# Patient Record
Sex: MALE | Race: WHITE | Employment: FULL TIME | URBAN - METROPOLITAN AREA
[De-identification: names, ages, dates, MRNs, and addresses within clinical notes are randomized per-mention and may not be internally consistent; named-entity substitution may affect disease eponyms.]

---

## 2018-02-08 PROBLEM — R06.09 DOE (DYSPNEA ON EXERTION): Status: ACTIVE | Noted: 2018-02-08

## 2022-03-18 ENCOUNTER — HOSPITAL ENCOUNTER (OUTPATIENT)
Dept: LAB | Age: 62
Discharge: HOME | End: 2022-03-18
Payer: COMMERCIAL

## 2022-03-18 DIAGNOSIS — I48.21 PERMANENT ATRIAL FIBRILLATION (HCC): ICD-10-CM

## 2022-03-18 PROBLEM — Z45.010 PACEMAKER AT END OF BATTERY LIFE: Status: ACTIVE | Noted: 2022-03-18

## 2022-03-18 LAB
ANION GAP SERPL CALC-SCNC: 4 MMOL/L (ref 7–16)
BASOPHILS # BLD: 0 K/UL (ref 0–0.2)
BASOPHILS NFR BLD: 1 % (ref 0–2)
BUN SERPL-MCNC: 14 MG/DL (ref 8–23)
CALCIUM SERPL-MCNC: 9.5 MG/DL (ref 8.3–10.4)
CHLORIDE SERPL-SCNC: 104 MMOL/L (ref 98–107)
CO2 SERPL-SCNC: 29 MMOL/L (ref 21–32)
CREAT SERPL-MCNC: 0.8 MG/DL (ref 0.8–1.5)
DIFFERENTIAL METHOD BLD: ABNORMAL
EOSINOPHIL # BLD: 0.1 K/UL (ref 0–0.8)
EOSINOPHIL NFR BLD: 2 % (ref 0.5–7.8)
ERYTHROCYTE [DISTWIDTH] IN BLOOD BY AUTOMATED COUNT: 11.9 % (ref 11.9–14.6)
GLUCOSE SERPL-MCNC: 99 MG/DL (ref 65–100)
HCT VFR BLD AUTO: 40.1 % (ref 41.1–50.3)
HGB BLD-MCNC: 14.2 G/DL (ref 13.6–17.2)
IMM GRANULOCYTES # BLD AUTO: 0 K/UL (ref 0–0.5)
IMM GRANULOCYTES NFR BLD AUTO: 0 % (ref 0–5)
INR PPP: 1
LYMPHOCYTES # BLD: 0.9 K/UL (ref 0.5–4.6)
LYMPHOCYTES NFR BLD: 24 % (ref 13–44)
MCH RBC QN AUTO: 32.3 PG (ref 26.1–32.9)
MCHC RBC AUTO-ENTMCNC: 35.4 G/DL (ref 31.4–35)
MCV RBC AUTO: 91.3 FL (ref 79.6–97.8)
MONOCYTES # BLD: 0.4 K/UL (ref 0.1–1.3)
MONOCYTES NFR BLD: 10 % (ref 4–12)
NEUTS SEG # BLD: 2.4 K/UL (ref 1.7–8.2)
NEUTS SEG NFR BLD: 64 % (ref 43–78)
NRBC # BLD: 0 K/UL (ref 0–0.2)
PLATELET # BLD AUTO: 202 K/UL (ref 150–450)
PMV BLD AUTO: 10.2 FL (ref 9.4–12.3)
POTASSIUM SERPL-SCNC: 3.8 MMOL/L (ref 3.5–5.1)
PROTHROMBIN TIME: 13.6 SEC (ref 12.6–14.5)
RBC # BLD AUTO: 4.39 M/UL (ref 4.23–5.6)
SODIUM SERPL-SCNC: 137 MMOL/L (ref 136–145)
WBC # BLD AUTO: 3.8 K/UL (ref 4.3–11.1)

## 2022-03-18 PROCEDURE — 80048 BASIC METABOLIC PNL TOTAL CA: CPT

## 2022-03-18 PROCEDURE — 36415 COLL VENOUS BLD VENIPUNCTURE: CPT

## 2022-03-18 PROCEDURE — 85025 COMPLETE CBC W/AUTO DIFF WBC: CPT

## 2022-03-18 PROCEDURE — 85610 PROTHROMBIN TIME: CPT

## 2022-03-19 PROBLEM — R06.09 DOE (DYSPNEA ON EXERTION): Status: ACTIVE | Noted: 2018-02-08

## 2022-03-22 NOTE — PROGRESS NOTES
Attempted to pre-assess patient for procedure tomorrow. Patient states that Kendell from [facility] cardiology had already called him and reviewed all instructions for pre-procedure. Reminded patient of NPO status after MN.

## 2022-03-23 ENCOUNTER — HOSPITAL ENCOUNTER (OUTPATIENT)
Age: 62
Setting detail: OUTPATIENT SURGERY
Discharge: HOME | End: 2022-03-23
Attending: INTERNAL MEDICINE | Admitting: INTERNAL MEDICINE
Payer: COMMERCIAL

## 2022-03-23 VITALS
HEIGHT: 71 IN | HEART RATE: 70 BPM | DIASTOLIC BLOOD PRESSURE: 92 MMHG | SYSTOLIC BLOOD PRESSURE: 115 MMHG | OXYGEN SATURATION: 93 % | BODY MASS INDEX: 22.4 KG/M2 | WEIGHT: 160 LBS | TEMPERATURE: 97.9 F

## 2022-03-23 DIAGNOSIS — Z95.0 CARDIAC PACEMAKER: ICD-10-CM

## 2022-03-23 DIAGNOSIS — I48.21 PERMANENT ATRIAL FIBRILLATION (HCC): Chronic | ICD-10-CM

## 2022-03-23 DIAGNOSIS — Z45.010 PACEMAKER AT END OF BATTERY LIFE: Primary | ICD-10-CM

## 2022-03-23 DIAGNOSIS — Z45.010 PACEMAKER AT END OF BATTERY LIFE: ICD-10-CM

## 2022-03-23 LAB
ATRIAL RATE: 40 BPM
ATRIAL RATE: 55 BPM
CALCULATED R AXIS, ECG10: 36 DEGREES
CALCULATED R AXIS, ECG10: 56 DEGREES
CALCULATED T AXIS, ECG11: 91 DEGREES
CALCULATED T AXIS, ECG11: 93 DEGREES
DIAGNOSIS, 93000: NORMAL
DIAGNOSIS, 93000: NORMAL
Q-T INTERVAL, ECG07: 478 MS
Q-T INTERVAL, ECG07: 518 MS
QRS DURATION, ECG06: 198 MS
QRS DURATION, ECG06: 200 MS
QTC CALCULATION (BEZET), ECG08: 472 MS
QTC CALCULATION (BEZET), ECG08: 497 MS
VENTRICULAR RATE, ECG03: 50 BPM
VENTRICULAR RATE, ECG03: 65 BPM

## 2022-03-23 PROCEDURE — 99152 MOD SED SAME PHYS/QHP 5/>YRS: CPT | Performed by: INTERNAL MEDICINE

## 2022-03-23 PROCEDURE — 77030013504 HC DEV ELECSURG MEDT -F: Performed by: INTERNAL MEDICINE

## 2022-03-23 PROCEDURE — 77030031139 HC SUT VCRL2 J&J -A: Performed by: INTERNAL MEDICINE

## 2022-03-23 PROCEDURE — 74011000250 HC RX REV CODE- 250: Performed by: INTERNAL MEDICINE

## 2022-03-23 PROCEDURE — 93005 ELECTROCARDIOGRAM TRACING: CPT | Performed by: INTERNAL MEDICINE

## 2022-03-23 PROCEDURE — 33227 REMOVE&REPLACE PM GEN SINGL: CPT | Performed by: INTERNAL MEDICINE

## 2022-03-23 PROCEDURE — 77030010507 HC ADH SKN DERMBND J&J -B: Performed by: INTERNAL MEDICINE

## 2022-03-23 PROCEDURE — 77030041279 HC DRSG PRMSL AG MDII -B: Performed by: INTERNAL MEDICINE

## 2022-03-23 PROCEDURE — 77030003028 HC SUT VCRL J&J -A: Performed by: INTERNAL MEDICINE

## 2022-03-23 PROCEDURE — 99153 MOD SED SAME PHYS/QHP EA: CPT | Performed by: INTERNAL MEDICINE

## 2022-03-23 PROCEDURE — C1786 PMKR, SINGLE, RATE-RESP: HCPCS | Performed by: INTERNAL MEDICINE

## 2022-03-23 PROCEDURE — 74011250636 HC RX REV CODE- 250/636: Performed by: INTERNAL MEDICINE

## 2022-03-23 DEVICE — IPG W1SR01 AZURE XT SR MRI USA
Type: IMPLANTABLE DEVICE | Status: FUNCTIONAL
Brand: AZURE™ XT SR MRI SURESCAN™

## 2022-03-23 RX ORDER — SODIUM CHLORIDE 9 MG/ML
75 INJECTION, SOLUTION INTRAVENOUS CONTINUOUS
Status: DISCONTINUED | OUTPATIENT
Start: 2022-03-23 | End: 2022-03-23 | Stop reason: HOSPADM

## 2022-03-23 RX ORDER — CEPHALEXIN 500 MG/1
500 CAPSULE ORAL 4 TIMES DAILY
Qty: 20 CAPSULE | Refills: 0 | Status: SHIPPED | OUTPATIENT
Start: 2022-03-23

## 2022-03-23 RX ORDER — SODIUM CHLORIDE 0.9 % (FLUSH) 0.9 %
5-40 SYRINGE (ML) INJECTION EVERY 8 HOURS
Status: DISCONTINUED | OUTPATIENT
Start: 2022-03-23 | End: 2022-03-23 | Stop reason: HOSPADM

## 2022-03-23 RX ORDER — CEFAZOLIN SODIUM/WATER 2 G/20 ML
2 SYRINGE (ML) INTRAVENOUS ONCE
Status: COMPLETED | OUTPATIENT
Start: 2022-03-23 | End: 2022-03-23

## 2022-03-23 RX ORDER — LIDOCAINE HYDROCHLORIDE 10 MG/ML
INJECTION, SOLUTION EPIDURAL; INFILTRATION; INTRACAUDAL; PERINEURAL AS NEEDED
Status: DISCONTINUED | OUTPATIENT
Start: 2022-03-23 | End: 2022-03-23 | Stop reason: HOSPADM

## 2022-03-23 RX ORDER — MIDAZOLAM HYDROCHLORIDE 1 MG/ML
INJECTION, SOLUTION INTRAMUSCULAR; INTRAVENOUS AS NEEDED
Status: DISCONTINUED | OUTPATIENT
Start: 2022-03-23 | End: 2022-03-23 | Stop reason: HOSPADM

## 2022-03-23 RX ORDER — SODIUM CHLORIDE 0.9 % (FLUSH) 0.9 %
5-40 SYRINGE (ML) INJECTION AS NEEDED
Status: DISCONTINUED | OUTPATIENT
Start: 2022-03-23 | End: 2022-03-23 | Stop reason: HOSPADM

## 2022-03-23 RX ORDER — HYDROCODONE BITARTRATE AND ACETAMINOPHEN 5; 325 MG/1; MG/1
1 TABLET ORAL
Qty: 20 TABLET | Refills: 0 | Status: SHIPPED | OUTPATIENT
Start: 2022-03-23 | End: 2022-03-28

## 2022-03-23 RX ORDER — FENTANYL CITRATE 50 UG/ML
INJECTION, SOLUTION INTRAMUSCULAR; INTRAVENOUS AS NEEDED
Status: DISCONTINUED | OUTPATIENT
Start: 2022-03-23 | End: 2022-03-23 | Stop reason: HOSPADM

## 2022-03-23 RX ADMIN — CEFAZOLIN SODIUM 2 G: 100 INJECTION, POWDER, LYOPHILIZED, FOR SOLUTION INTRAVENOUS at 14:59

## 2022-03-23 NOTE — PROGRESS NOTES
TRANSFER - OUT REPORT:    Verbal report given to Duglas Thompson RN(name) on [employer]  being transferred to CPRU(unit) for routine progression of care       Report consisted of patients Situation, Background, Assessment and   Recommendations(SBAR). Information from the following report(s) SBAR was reviewed with the receiving nurse.     Medtronic pacemaker generator change with Dr Lyon Rota: VVI 50  Left chest incision closed with sutures and dermabond  Covered with aquacel  Versed 3mg  Fentanyl 75mcg  Pt tolerated well

## 2022-03-23 NOTE — DISCHARGE INSTRUCTIONS
Post Op Device Instructions        Incision & Dressing Care   Please keep Aquacel dressing on wound until your 2 week follow up in device clinic. The dressing promotes healing and is meant to stay on the wound. Keep incision site completely dry for 48 hours. During this time you may take a sponge bath, but no shower. After 48 hours you may shower with your back to the water letting the water run over the dressing. Do not let the water directly hit the dressing, it is water resistant no water proof. Do not use any lotions, creams, or ointments on the incision. Avoid manipulating the device or leads with your fingers. Do not massage or excessively rub the implant site. This could displace the leads      For four weeks after your implant   Do not lift anything heavier than a gallon of milk. Do not raise your elbow above the level of your shoulder on the Left shoulder implant side. Avoid excessive pushing, pulling, or twisting. Call you doctor for any of the following:   Any signs of infection, including redness, swelling or pain at the incision site, or a   temperature of 101° F or greater. If you have twitching chest muscles, hiccups that won't stop, or a swollen arm on the   side of the incision. Any feelings of light-headedness, chest pain, or shortness of breath. Please notify your doctors and dentists that you have an Pacemaker. You should not drive until 1 week after your implant or after your first follow-up appointment, whichever comes first. At that time, you can discuss when you may return to your regular driving routine. About 1 month after implant, you will receive a card by mail from the company who manufactured your pacemaker. Your device was manufactured by [employer]. You should carry this card with you at all times. Please call the [address] [place] [address] Drive at  [phone] if you have questions or concerns about your device.  Please call the hospital if it is after 5 pm or the weekend please page the on call cardiologist at [facility] at [address] will need to have your device checked 1- 2 weeks after the procedure and should have an appointment with the device clinic. If you do not hear from them call the device clinic. Sedation for a Medical Procedure: Care Instructions     You were given a sedative medication during your visit. While many of the effects will have worn   off before you leave; you may continue to feel some effects for several hours. Common side effects from sedation include:  · Feeling sleepy. (Your doctors and nurses will make sure you are not too sleepy to go home.)  · Nausea and vomiting. This usually does not last long. · Feeling tired. How can you care for yourself at home? Activity    · Don't do anything for 24 hours that requires attention to detail. It takes time for the medicine effects to completely wear off. · Do not make important legal decisions for 24 hours. · Do not sign any legal documents for 24 hours. · Do not drink alcohol today     · For your safety, you should not drive or operate heavy machinery for the remainder of the day     · Rest when you feel tired. Getting enough sleep will help you recover. Diet    · You can eat your normal diet, unless your doctor gives you other instructions. If your stomach is upset, try clear liquids and bland, low-fat foods like plain toast or rice. · Drink plenty of fluids (unless your doctor tells you not to). · Don't drink alcohol for 24 hours. Medicines    · Be safe with medicines. Read and follow all instructions on the label. · If the doctor gave you a prescription medicine for pain, take it as prescribed. · If you are not taking a prescription pain medicine, ask your doctor if you can take an over-the-counter medicine.      · If you think your pain medicine is making you sick to your stomach:  · Take your medicine after meals (unless your doctor has told you not to). · Ask your doctor for a different pain medicine. I have read the above instructions and have had the opportunity to ask questions.       Patient: ________________________   Date: _____________    Witness: _______________________   Date: _____________

## 2022-03-23 NOTE — PROGRESS NOTES
Patient up to bedside, vital signs and site stable. Patient ambulated to bathroom without difficulty. Patient voided without difficulty. Incision site stable. Discharge instructions and home medications reviewed with patient. Time allowed for questions and answers. Site stable after ambulation. Peripheral IV sites dc'd without difficulty with tips intact. 1520 Patient discharged to home with family.

## 2022-03-23 NOTE — Clinical Note
Aspirin Registry Question:   Aspirin was discussed with physician prior to the procedure. Aspirin was not given prior to the procedure.  Not applicable

## 2022-03-23 NOTE — PROGRESS NOTES
Patient received to [address] [place] [address] room # 12  Ambulatory from [facility]. Patient scheduled for pacemaker generator change today with Dr Wilmer Baig. Procedure reviewed & questions answered, voiced good understanding consent obtained & placed on chart. All medications and medical history reviewed. Will prep patient per orders. Patient & family updated on plan of care.

## 2022-03-23 NOTE — PROGRESS NOTES
Report received from [address] [place] [address]. Procedural findings communicated. Intra procedural  medication administration reviewed. Progression of care discussed. Patient received into [id number] [facility] post procedure.      Incision site without bleeding or swelling yes    Dressing dry and intact yes    Patient instructed to limit movement to right upper extremity    Routine post procedural vital signs and site assessment initiated yes

## 2022-03-24 PROBLEM — Z45.010 PACEMAKER AT END OF BATTERY LIFE: Status: ACTIVE | Noted: 2022-03-18

## 2022-08-10 RX ORDER — LOSARTAN POTASSIUM 25 MG/1
TABLET ORAL
Qty: 90 TABLET | Refills: 0 | Status: SHIPPED | OUTPATIENT
Start: 2022-08-10 | End: 2022-08-15 | Stop reason: SDUPTHER

## 2022-08-11 ENCOUNTER — TELEPHONE (OUTPATIENT)
Dept: CARDIOLOGY CLINIC | Age: 62
End: 2022-08-11

## 2022-08-11 NOTE — TELEPHONE ENCOUNTER
Remote received w/ increasing RV pacing noted. Appt scheduled to turn on rate response and schedule a cardiology appt.

## 2022-08-15 RX ORDER — LOSARTAN POTASSIUM 25 MG/1
TABLET ORAL
Qty: 90 TABLET | Refills: 0 | Status: SHIPPED | OUTPATIENT
Start: 2022-08-15 | End: 2022-08-29 | Stop reason: SDUPTHER

## 2022-08-28 ASSESSMENT — ENCOUNTER SYMPTOMS
ABDOMINAL DISTENTION: 0
PHOTOPHOBIA: 0
COUGH: 0
DIARRHEA: 0
ABDOMINAL PAIN: 0
SORE THROAT: 0
CONSTIPATION: 0

## 2022-08-28 NOTE — PROGRESS NOTES
[facility] CARDIOLOGY  [address], [address]  PHONE: [phone]        NAME:  Britni Schaefer  : 1960  MRN: [id number]     PCP:  Ellie Cartagena MD      SUBJECTIVE:   Britni Schaefer is a 57 y.o. male seen for a follow up visit regarding the following:     Chief Complaint   Patient presents with    Atrial Fibrillation    Follow-up       HPI:    Doing well since last visit without interval angina, CHF, palpitations, edema, presyncope or syncope but still complains of exertional dyspnea, stable with no signs or symptoms of volume overload. He is in permanent atrial fibrillation with a ventricular pacemaker. Ejection fraction historically normal.  [id number]-EDOb or low. He is still exercising but having more musculoskeletal complaints today and complains of poor sleep recently as well. Prior nuclear stress from   normal and echo from 2016 in [facility] showed low normal EF without any significant valvular pathology, stable RV and biatrial enlargement. He has as CHADS-VASC score of \"0\" and prefers to continue low dose ASA at present rather than risk  bleeding issues with coumadin or NOAC. Nuclear stress test 2018 showed normal perfusion without ischemia, EF 61%. Echo at that time showed low normal EF with no significant valvular pathology and normal otherwise. He tried toprol XL 25mg daily with recurrent exertional/exercise HR  >180bpm and didn't like the way the Toprol made him feel. Still with occasional bursts of tachy on interrogation today but usually with bike riding/exercise/exertion around the home. Pacing ~ 50% (V-pacing) in permanent AF. Nuclear stress test a couple of days prior to our last visit also normal with ejection fraction 64%, normal left ventricular regional wall motion and ejection fraction, and normal perfusion with no ischemia.   He never had his echocardiogram.  He is clinically euvolemic  today. His blood pressure has been good but he is tolerating losartan and HCTZ fairly well, refilling today. Going [facility]. Past Medical History, Past Surgical History, Family history, Social History, and Medications were all reviewed with the patient today and updated as necessary. Current Outpatient Medications   Medication Sig Dispense Refill    Multiple Vitamins-Minerals (MULTI COMPLETE PO) Take by mouth daily      losartan (COZAAR) 25 MG tablet TAKE 1 TABLET BY MOUTH DAILY 90 tablet 0    hydroCHLOROthiazide (HYDRODIURIL) 25 MG tablet Take 25 mg by mouth daily       No current facility-administered medications for this visit. No Known Allergies    Patient Active Problem List    Diagnosis Date Noted    Pacemaker at end of battery life 03/18/2022     Overview Note:     Added automatically from request for surgery [id number]        MONTES (dyspnea on exertion) 02/08/2018    Permanent atrial fibrillation ([facility] 10/07/2016     Overview Note:     CHADS of \"0\", wants to continue ASA therapy        Malaise and fatigue 10/07/2016     Overview Note:     Tiredness/Weakness        Cardiac pacemaker 10/07/2016    Sick sinus syndrome ([address] 08/08/2016        Past Surgical History:   Procedure Laterality Date    ORTHOPEDIC SURGERY      ACL repair    ORTHOPEDIC SURGERY      meniscus repair    PACEMAKER      VT CARDIAC SURG PROCEDURE UNLIST         History reviewed. No pertinent family history. Social History     Tobacco Use    Smoking status: Never    Smokeless tobacco: Never   Substance Use Topics    Alcohol use: Yes       ROS:    Review of Systems   Constitutional:  Negative for appetite change, chills, diaphoresis and fatigue. HENT:  Negative for congestion, mouth sores, nosebleeds, sore throat and tinnitus. Eyes:  Negative for photophobia and visual disturbance. Respiratory:  Positive for shortness of breath. Negative for cough.     Cardiovascular:  Negative for chest pain, palpitations and leg swelling. Gastrointestinal:  Negative for abdominal distention, abdominal pain, constipation and diarrhea. Endocrine: Negative for cold intolerance, heat intolerance, polydipsia and polyuria. Genitourinary:  Negative for dysuria and hematuria. Musculoskeletal:  Negative for arthralgias, joint swelling and myalgias. Skin:  Negative for rash. Allergic/Immunologic: Negative for environmental allergies and food allergies. Neurological:  Negative for dizziness, seizures, syncope and light-headedness. Hematological:  Negative for adenopathy. Does not bruise/bleed easily. Psychiatric/Behavioral:  Negative for agitation, behavioral problems, dysphoric mood and hallucinations. The patient is not nervous/anxious. PHYSICAL EXAM:     Vitals:    08/29/22 0952   BP: 106/80   Pulse: 50   Weight: 162 lb 1.6 oz (73.5 kg)   Height: 5' 11\" (1.803 m)      Wt Readings from Last 3 Encounters:   08/29/22 162 lb 1.6 oz (73.5 kg)   07/01/21 160 lb (72.6 kg)   06/08/21 160 lb (72.6 kg)      BP Readings from Last 3 Encounters:   08/29/22 106/80   07/01/21 (!) 140/84   06/08/21 (!) 140/90        Physical Exam  Constitutional:       Appearance: Normal appearance. He is normal weight. HENT:      Head: Normocephalic and atraumatic. Nose: Nose normal.      Mouth/Throat:      Mouth: Mucous membranes are moist.      Pharynx: Oropharynx is clear. Eyes:      Extraocular Movements: Extraocular movements intact. Pupils: Pupils are equal, round, and reactive to light. Neck:      Vascular: No carotid bruit or JVD. Cardiovascular:      Rate and Rhythm: Normal rate and regular rhythm. Heart sounds: No murmur heard. No friction rub. No gallop. Comments: Pacer right chest well healed  Pulmonary:      Effort: Pulmonary effort is normal.      Breath sounds: Normal breath sounds. No wheezing or rhonchi. Abdominal:      General: Abdomen is flat.  Bowel sounds are normal. There is no distension. Palpations: Abdomen is soft. Tenderness: There is no abdominal tenderness. Musculoskeletal:         General: No swelling. Normal range of motion. Cervical back: Normal range of motion and neck supple. No tenderness. Skin:     General: Skin is warm and dry. Neurological:      General: No focal deficit present. Mental Status: He is alert and oriented to person, place, and time. Mental status is at baseline. Psychiatric:         Mood and Affect: Mood normal.         Behavior: Behavior normal.        Medical problems and test results were reviewed with the patient today. No results found for: CHOL  No results found for: TRIG  No results found for: HDL  No results found for: LDLCHOLESTEROL, LDLCALC  No results found for: LABVLDL, VLDL  No results found for: [facility]     Lab Results   Component Value Date/Time     03/18/2022 10:01 AM    K 3.8 03/18/2022 10:01 AM     03/18/2022 10:01 AM    CO2 29 03/18/2022 10:01 AM    BUN 14 03/18/2022 10:01 AM    CREATININE 0.80 03/18/2022 10:01 AM    GLUCOSE 99 03/18/2022 10:01 AM    CALCIUM 9.5 03/18/2022 10:01 AM         No results for input(s): WBC, HGB, HCT, MCV, PLT in the last 720 hours. No results found for: LABA1C  No results found for: EAG     No results found for: BNP     No results found for: TSHFT4, TSH     Results for orders placed or performed in visit on 08/29/22   EKG 12 Lead    Impression    Electronic ventricular pacemaker 50bpm  Underlying atrial fibrillation        ASSESSMENT and PLAN:     Diagnoses and all orders for this visit:      Sick sinus syndrome ([address]- tachy-michelle syndrome with permanent AF-  pacer interrogation showed blunted histograms now with HR <50bpm 34-47% of the time despite no rate slowing meds, but also complains  of extreme tachy and SOB with exercise/bicycling- see below- clinically asymptomatic at rest, but fatigued chronically - nuclear stress and echo OK.   Increased base rate to 60bpm, added toprol XL 25mg daily but didn't like the way it made him feel, offered  trial of cardizem CD and increase base rate to 60-70 but he prefers to avoid changes today. Ultimately we converted him back to lower rate limit of 50bpm to prevent lots of RV pacing. ? MONTES due to michelle and RV pacing -  ? Consider BiV upgrade and increased base rate if continues to complain of SOB/MONTES/fatigue. Permanent atrial fibrillation ([address]- rate control stable, very active without palpitations, presyncope or syncope. Continue pacer interrogation surveillance, add rate slowing meds as needed in  future. CHADS-VASC of 0- discussed options of NOAC/coumadin vs. ASA, he wishes to just continue low dose ASA therapy for now. Cardiac pacemaker- site looks good today, see above, continue routine surveillance. Malaise and fatigue- slowly worsening but remains active and still exercising regularly. Continue meds/pacer monitoring. SOB/fatigue- normal nuclear stress- had sleep study several years ago that was reportedly negative. Return in about 6 months (around 2/28/2023).          Caesar Gonzalez MD  8/29/2022  10:16 AM

## 2022-08-29 ENCOUNTER — OFFICE VISIT (OUTPATIENT)
Dept: CARDIOLOGY CLINIC | Age: 62
End: 2022-08-29
Payer: COMMERCIAL

## 2022-08-29 VITALS
HEIGHT: 71 IN | BODY MASS INDEX: 22.69 KG/M2 | WEIGHT: 162.1 LBS | HEART RATE: 50 BPM | DIASTOLIC BLOOD PRESSURE: 80 MMHG | SYSTOLIC BLOOD PRESSURE: 106 MMHG

## 2022-08-29 DIAGNOSIS — Z95.0 CARDIAC PACEMAKER: ICD-10-CM

## 2022-08-29 DIAGNOSIS — I49.5 SICK SINUS SYNDROME (HCC): ICD-10-CM

## 2022-08-29 DIAGNOSIS — R06.09 DOE (DYSPNEA ON EXERTION): ICD-10-CM

## 2022-08-29 DIAGNOSIS — I48.21 PERMANENT ATRIAL FIBRILLATION (HCC): Primary | ICD-10-CM

## 2022-08-29 PROCEDURE — 99214 OFFICE O/P EST MOD 30 MIN: CPT | Performed by: INTERNAL MEDICINE

## 2022-08-29 PROCEDURE — 93000 ELECTROCARDIOGRAM COMPLETE: CPT | Performed by: INTERNAL MEDICINE

## 2022-08-29 RX ORDER — HYDROCHLOROTHIAZIDE 25 MG/1
25 TABLET ORAL DAILY
Qty: 90 TABLET | Refills: 3 | Status: SHIPPED | OUTPATIENT
Start: 2022-08-29

## 2022-08-29 RX ORDER — LOSARTAN POTASSIUM 25 MG/1
TABLET ORAL
Qty: 90 TABLET | Refills: 3 | Status: SHIPPED | OUTPATIENT
Start: 2022-08-29

## 2022-08-29 ASSESSMENT — ENCOUNTER SYMPTOMS: SHORTNESS OF BREATH: 1

## 2022-09-13 DIAGNOSIS — I48.21 PERMANENT ATRIAL FIBRILLATION (HCC): Primary | ICD-10-CM

## 2022-09-13 DIAGNOSIS — I49.5 SICK SINUS SYNDROME (HCC): ICD-10-CM

## 2022-09-19 DIAGNOSIS — I48.21 PERMANENT ATRIAL FIBRILLATION (HCC): Primary | ICD-10-CM

## 2022-09-19 DIAGNOSIS — I49.5 SICK SINUS SYNDROME (HCC): ICD-10-CM

## 2022-10-19 PROCEDURE — 93296 REM INTERROG EVL PM/IDS: CPT | Performed by: INTERNAL MEDICINE

## 2022-10-19 PROCEDURE — 93294 REM INTERROG EVL PM/LDLS PM: CPT | Performed by: INTERNAL MEDICINE

## 2022-11-11 DIAGNOSIS — I48.21 PERMANENT ATRIAL FIBRILLATION (HCC): ICD-10-CM

## 2022-11-11 DIAGNOSIS — I49.5 SICK SINUS SYNDROME (HCC): ICD-10-CM

## 2022-11-11 DIAGNOSIS — Z95.0 CARDIAC PACEMAKER: Primary | ICD-10-CM

## 2022-11-11 DIAGNOSIS — Z95.0 CARDIAC PACEMAKER: ICD-10-CM

## 2022-11-23 RX ORDER — LOSARTAN POTASSIUM 25 MG/1
TABLET ORAL
Qty: 90 TABLET | Refills: 3 | Status: SHIPPED | OUTPATIENT
Start: 2022-11-23

## 2023-03-07 DIAGNOSIS — Z95.0 CARDIAC PACEMAKER: ICD-10-CM

## 2023-03-07 DIAGNOSIS — I48.21 PERMANENT ATRIAL FIBRILLATION (HCC): ICD-10-CM

## 2023-03-07 DIAGNOSIS — I49.5 SICK SINUS SYNDROME (HCC): ICD-10-CM

## 2023-04-02 ASSESSMENT — ENCOUNTER SYMPTOMS
SORE THROAT: 0
ABDOMINAL DISTENTION: 0
SHORTNESS OF BREATH: 1
DIARRHEA: 0
CONSTIPATION: 0
COUGH: 0
PHOTOPHOBIA: 0
ABDOMINAL PAIN: 0

## 2023-04-02 NOTE — PROGRESS NOTES
syndrome ([facility] [address]- tachy-michelle syndrome with permanent AF-  pacer interrogation showed blunted histograms now with HR <50bpm 34-47% of the time despite no rate slowing meds, but also complains  of extreme tachy and SOB with exercise/bicycling- see below- clinically asymptomatic at rest, but fatigued chronically - nuclear stress and echo OK. Increased base rate to 60bpm, added toprol XL 25mg daily but didn't like the way it made him feel, offered  trial of cardizem CD and increase base rate to 60-70 but he prefers to avoid changes today. Ultimately we converted him back to lower rate limit of 50bpm to prevent lots of RV pacing. Currently pacing RV 44% of the time. .. ? Consider BiV upgrade and increased base rate if continues to complain of SOB/MONTES/fatigue. Permanent [facility] fibrillation ([facility]- rate control stable, very active without palpitations, presyncope or syncope. Continue pacer interrogation surveillance, add rate slowing meds as needed in  future. CHADS-VASC of 1- discussed options of NOAC/coumadin vs. ASA, he wishes to just continue low dose ASA therapy for now. At next visit his score will be 2. See above. He will research Eliquis and let me know if he's ready to start, understands increased CVA risk without [address] on board. Hypertension-well-controlled, continue losartan and HCTZ as currently taking. Cardiac pacemaker- site looks good today, see above, continue routine surveillance. Malaise and fatigue-improved and stable since last visit with pacer adjustments as noted above, remains active and still exercising regularly. Continue meds/pacer monitoring. SOB/fatigue-improved, see above, normal nuclear stress- had sleep study several years ago that was reportedly negative. Return in about 6 months (around 10/4/2023).          Fabi Sol MD  4/4/2023  8:57 AM

## 2023-04-04 ENCOUNTER — OFFICE VISIT (OUTPATIENT)
Dept: CARDIOLOGY CLINIC | Age: 63
End: 2023-04-04
Payer: COMMERCIAL

## 2023-04-04 VITALS
SYSTOLIC BLOOD PRESSURE: 118 MMHG | HEIGHT: 71 IN | DIASTOLIC BLOOD PRESSURE: 74 MMHG | WEIGHT: 166.9 LBS | BODY MASS INDEX: 23.36 KG/M2 | HEART RATE: 51 BPM

## 2023-04-04 DIAGNOSIS — R06.09 DOE (DYSPNEA ON EXERTION): ICD-10-CM

## 2023-04-04 DIAGNOSIS — Z95.0 CARDIAC PACEMAKER: ICD-10-CM

## 2023-04-04 DIAGNOSIS — I48.21 PERMANENT ATRIAL FIBRILLATION (HCC): Primary | ICD-10-CM

## 2023-04-04 PROCEDURE — 99214 OFFICE O/P EST MOD 30 MIN: CPT | Performed by: INTERNAL MEDICINE

## 2023-04-04 PROCEDURE — 93000 ELECTROCARDIOGRAM COMPLETE: CPT | Performed by: INTERNAL MEDICINE

## 2023-04-28 ENCOUNTER — TELEPHONE (OUTPATIENT)
Dept: CARDIOLOGY CLINIC | Age: 63
End: 2023-04-28

## 2023-04-28 NOTE — TELEPHONE ENCOUNTER
Patient called stating that Dr Sofia Koch had discussed starting Eliquis during his last visit. Patient states he's ready to start but would to speak with someone first.    Please call and advise.

## 2023-05-08 ENCOUNTER — TELEPHONE (OUTPATIENT)
Age: 63
End: 2023-05-08

## 2023-05-08 NOTE — TELEPHONE ENCOUNTER
Pt would like to know if aspirin would be a good option for him to take because he rather take that than xarelto.

## 2023-05-08 NOTE — TELEPHONE ENCOUNTER
ASA does not reduce stroke risk in AF. Xarelto or eliquis is the best therapy for this and better than coumadin. I would continue xarelto. Easy to take, once daily.

## 2023-05-09 NOTE — TELEPHONE ENCOUNTER
Pt returned call. Pt informed of Dr. Mark Potts' response. Pt states he will try the xarelto and call if he has any reactions after starting.

## 2023-07-05 DIAGNOSIS — I48.21 PERMANENT ATRIAL FIBRILLATION (HCC): ICD-10-CM

## 2023-07-05 DIAGNOSIS — I49.5 SICK SINUS SYNDROME (HCC): ICD-10-CM

## 2023-07-05 DIAGNOSIS — Z95.0 CARDIAC PACEMAKER: ICD-10-CM

## 2023-07-10 ENCOUNTER — TELEPHONE (OUTPATIENT)
Age: 63
End: 2023-07-10

## 2023-07-10 ASSESSMENT — ENCOUNTER SYMPTOMS
DIARRHEA: 0
SORE THROAT: 0
PHOTOPHOBIA: 0
ABDOMINAL PAIN: 0
CONSTIPATION: 0
COUGH: 0
ABDOMINAL DISTENTION: 0

## 2023-07-10 NOTE — TELEPHONE ENCOUNTER
Pt dropped off a piece of paper for Dr. Joaquina Castillo. Placed in [address] [place] [address].

## 2023-07-10 NOTE — TELEPHONE ENCOUNTER
Pt c/o could not get up Sat due to weakness, fatigue. Went to [facility] ED. States mdt device interrogated and HR set at 50 bpm. Suggested rate should be increased to 60 bpm.   Asked pt to force transmission this AM.    Encourage pt to hydrate with water, at least 4-16oz yanez daily, more if doing outdoor recreation or activity. When hydration is low it can cause BP to drop, HR to speed up, trigger A fib, cause A Fib sx. Pt states sx not hydration issue. From device. Informed him someone from Device Dept will call. Pt voiced understanding.  cgh

## 2023-07-10 NOTE — PROGRESS NOTES
[facility] CARDIOLOGY  [address] [facility]  [place], [address] [facility]  PHONE: [phone]        NAME:  Ann-Marie Schaefer  : 1960  MRN: [id number]     PCP:  Nito Leigh MD      SUBJECTIVE:   Ann-Marie Schaefer is a 70 y.o. male seen for a follow up visit regarding the following:     Chief Complaint   Patient presents with    Atrial Fibrillation    Shortness of Breath       HPI:     He is in permanent atrial fibrillation with a ventricular pacemaker. Ejection fraction historically normal.  [id number] score 2. Taking Xarelto, losartan, and hydrochlorothiazide but recently had extreme fatigue and malaise while [place]. He came [place] the ER once [place] got [place] the [place] with evaluation completely normal including serial troponins, BNP, BMP and CBC. He is still pacing about 40 to 44% of the time but this is unchanged compared to the previous couple of years. Improving and I think his symptoms are most likely noncardiac. He denies any exertional dyspnea, volume overload symptoms, tachypalpitations, or angina whatsoever. Nuclear stress test 2018 showed normal perfusion without ischemia, EF 61%. Echo at that time showed low normal EF with no significant valvular pathology and normal otherwise. He tried toprol XL 25mg daily with recurrent exertional/exercise HR  >180bpm and didn't like the way the Toprol made him feel. Still with occasional bursts of tachy on interrogation today but usually with bike riding/exercise/exertion around the home. Pacing ~ 45-50% (V-pacing) in permanent AF. Nuclear stress test prior to our last visit also normal with ejection fraction 64%, normal left ventricular regional wall motion and ejection fraction, and normal perfusion with no ischemia. He never had his echocardiogram.  Clinically euvolemic today.     Atrial Fibrillation CHADSVASC2 Score Stroke Risk:   70 y.o. 77-78 - 1   male Male - 0   CHF HX: No - 0   HTN HX:

## 2023-07-10 NOTE — TELEPHONE ENCOUNTER
I think he would do much better in the long run if we upgraded to a biventricular pacemaker and increased his basal rate to 60 with rate response. Ask him if he is willing to consider this and if he is refer him to EP. If not willing to consider, increase his base rate to 60 and increased rate response. I think he is pacing his RV more the older he gets, but his resistance to augmenting device therapy has made symptoms worse.

## 2023-07-10 NOTE — TELEPHONE ENCOUNTER
Attempted to inform pt of Dr. Loli Drake response. Pt not happy with [facility] service, not happy with response. Frequent interruptions and arguments per pt. He states PPM is <1yr old. Why should it need to be updated? Pt does not wish to talk to Device Dept. Only wants to see Dr. Loli Drake. Offered appt Fri 7/14/23. Pt states may not make it until then. Will be \"flat\". Advise ER prn. Pt states was just in ER, Fri. Advise pt go to [facility] ED, UCD MD on call 24/7. Pt disgruntled. Pt states offended that Triage suggested his sx could be improved with drinking water. Informed him many calls since warmer weather of increased sx due to poor hydration. Informed pt may not be the source of sx but may help ease sx. Pt hung up on me.   Appt Fri 7/14/23 2p Dr. Loli Drake MA.  Camila Chadwick

## 2023-07-10 NOTE — TELEPHONE ENCOUNTER
[employer] report received. Patient has chronic afib w/ controlled rates. RV pacing at 41% increasing the rate will only increase RV pacing. Last echo in 7/21. Per last note he mentioned ? Upgrade. Would defer back to provider. Sick sinus syndrome ([address])- tachy-michelle syndrome with permanent AF-  pacer interrogation showed blunted histograms now with HR <50bpm 34-47% of the time despite no rate slowing meds, but also complains  of extreme tachy and SOB with exercise/bicycling- see below- clinically asymptomatic at rest, but fatigued chronically - nuclear stress and echo OK. Increased base rate to 60bpm, added toprol XL 25mg daily but didn't like the way it made him feel, offered  trial of cardizem CD and increase base rate to 60-70 but he prefers to avoid changes today. Ultimately we converted him back to lower rate limit of 50bpm to prevent lots of RV pacing. Currently pacing RV 44% of the time. .. ? Consider BiV upgrade and increased base rate if continues to complain of SOB/MONTES/fatigue.

## 2023-07-10 NOTE — TELEPHONE ENCOUNTER
Patient called stating he is experiencing the following symptoms :    Groggy  No energy  SOB  HR is trending upward  ER visit over weekend  Patient mentioned low potassium  Has pacemaker

## 2023-07-12 PROCEDURE — 93296 REM INTERROG EVL PM/IDS: CPT | Performed by: INTERNAL MEDICINE

## 2023-07-12 PROCEDURE — 93294 REM INTERROG EVL PM/LDLS PM: CPT | Performed by: INTERNAL MEDICINE

## 2023-07-14 ENCOUNTER — OFFICE VISIT (OUTPATIENT)
Age: 63
End: 2023-07-14

## 2023-07-14 VITALS
SYSTOLIC BLOOD PRESSURE: 122 MMHG | HEART RATE: 56 BPM | BODY MASS INDEX: 23.24 KG/M2 | DIASTOLIC BLOOD PRESSURE: 68 MMHG | WEIGHT: 166 LBS | HEIGHT: 71 IN

## 2023-07-14 DIAGNOSIS — R53.83 MALAISE AND FATIGUE: ICD-10-CM

## 2023-07-14 DIAGNOSIS — R06.09 DOE (DYSPNEA ON EXERTION): ICD-10-CM

## 2023-07-14 DIAGNOSIS — I48.21 PERMANENT ATRIAL FIBRILLATION (HCC): Primary | ICD-10-CM

## 2023-07-14 DIAGNOSIS — I49.5 SICK SINUS SYNDROME (HCC): ICD-10-CM

## 2023-07-14 DIAGNOSIS — Z95.0 CARDIAC PACEMAKER: ICD-10-CM

## 2023-07-14 DIAGNOSIS — R53.81 MALAISE AND FATIGUE: ICD-10-CM

## 2023-07-14 RX ORDER — MAGNESIUM 200 MG
200 TABLET ORAL DAILY
COMMUNITY

## 2023-07-14 ASSESSMENT — ENCOUNTER SYMPTOMS: SHORTNESS OF BREATH: 0

## 2023-08-23 ENCOUNTER — NURSE ONLY (OUTPATIENT)
Age: 63
End: 2023-08-23

## 2023-08-23 DIAGNOSIS — I49.5 SICK SINUS SYNDROME (HCC): Primary | ICD-10-CM

## 2023-08-23 DIAGNOSIS — I48.21 PERMANENT ATRIAL FIBRILLATION (HCC): ICD-10-CM

## 2023-09-22 ENCOUNTER — HOSPITAL ENCOUNTER (EMERGENCY)
Age: 63
Discharge: HOME | End: 2023-09-22
Attending: EMERGENCY MEDICINE
Payer: MEDICARE

## 2023-09-22 ENCOUNTER — APPOINTMENT (OUTPATIENT)
Dept: GENERAL RADIOLOGY | Age: 63
End: 2023-09-22
Payer: MEDICARE

## 2023-09-22 VITALS
HEIGHT: 71 IN | HEART RATE: 51 BPM | TEMPERATURE: 97.7 F | OXYGEN SATURATION: 96 % | RESPIRATION RATE: 16 BRPM | DIASTOLIC BLOOD PRESSURE: 78 MMHG | SYSTOLIC BLOOD PRESSURE: 139 MMHG | WEIGHT: 168 LBS | BODY MASS INDEX: 23.52 KG/M2

## 2023-09-22 DIAGNOSIS — R05.9 COUGH, UNSPECIFIED TYPE: ICD-10-CM

## 2023-09-22 DIAGNOSIS — R06.00 DYSPNEA, UNSPECIFIED TYPE: Primary | ICD-10-CM

## 2023-09-22 LAB
ANION GAP SERPL CALC-SCNC: 4 MMOL/L (ref 2–11)
BASOPHILS # BLD: 0 K/UL (ref 0–0.2)
BASOPHILS NFR BLD: 0 % (ref 0–2)
BILIRUB UR QL: NEGATIVE
BUN SERPL-MCNC: 11 MG/DL (ref 8–23)
CALCIUM SERPL-MCNC: 9.4 MG/DL (ref 8.3–10.4)
CHLORIDE SERPL-SCNC: 108 MMOL/L (ref 101–110)
CO2 SERPL-SCNC: 27 MMOL/L (ref 21–32)
CREAT SERPL-MCNC: 0.9 MG/DL (ref 0.8–1.5)
D DIMER PPP FEU-MCNC: 0.4 UG/ML(FEU)
DIFFERENTIAL METHOD BLD: ABNORMAL
EKG DIAGNOSIS: NORMAL
EKG Q-T INTERVAL: 422 MS
EKG QRS DURATION: 96 MS
EKG QTC CALCULATION (BAZETT): 452 MS
EKG R AXIS: -67 DEGREES
EKG T AXIS: -15 DEGREES
EKG VENTRICULAR RATE: 69 BPM
EOSINOPHIL # BLD: 0 K/UL (ref 0–0.8)
EOSINOPHIL NFR BLD: 0 % (ref 0.5–7.8)
ERYTHROCYTE [DISTWIDTH] IN BLOOD BY AUTOMATED COUNT: 12.7 % (ref 11.9–14.6)
GLUCOSE SERPL-MCNC: 126 MG/DL (ref 65–100)
GLUCOSE UR QL STRIP.AUTO: NEGATIVE MG/DL
HCT VFR BLD AUTO: 37.6 % (ref 41.1–50.3)
HGB BLD-MCNC: 13.6 G/DL (ref 13.6–17.2)
IMM GRANULOCYTES # BLD AUTO: 0 K/UL (ref 0–0.5)
IMM GRANULOCYTES NFR BLD AUTO: 0 % (ref 0–5)
KETONES UR-MCNC: NEGATIVE MG/DL
LEUKOCYTE ESTERASE UR QL STRIP: NEGATIVE
LYMPHOCYTES # BLD: 0.4 K/UL (ref 0.5–4.6)
LYMPHOCYTES NFR BLD: 6 % (ref 13–44)
MAGNESIUM SERPL-MCNC: 2.3 MG/DL (ref 1.8–2.4)
MCH RBC QN AUTO: 33.2 PG (ref 26.1–32.9)
MCHC RBC AUTO-ENTMCNC: 36.2 G/DL (ref 31.4–35)
MCV RBC AUTO: 91.7 FL (ref 82–102)
MONOCYTES # BLD: 0.1 K/UL (ref 0.1–1.3)
MONOCYTES NFR BLD: 2 % (ref 4–12)
NEUTS SEG # BLD: 6.2 K/UL (ref 1.7–8.2)
NEUTS SEG NFR BLD: 92 % (ref 43–78)
NITRITE UR QL: NEGATIVE
NRBC # BLD: 0 K/UL (ref 0–0.2)
NT PRO BNP: 495 PG/ML (ref 5–125)
PH UR: 7 (ref 5–9)
PLATELET # BLD AUTO: 159 K/UL (ref 150–450)
PMV BLD AUTO: 9.5 FL (ref 9.4–12.3)
POTASSIUM SERPL-SCNC: 3.6 MMOL/L (ref 3.5–5.1)
PROT UR QL: NEGATIVE MG/DL
RBC # BLD AUTO: 4.1 M/UL (ref 4.23–5.6)
RBC # UR STRIP: NEGATIVE
SERVICE CMNT-IMP: NORMAL
SODIUM SERPL-SCNC: 139 MMOL/L (ref 133–143)
SP GR UR: 1.02 (ref 1–1.02)
TROPONIN I SERPL HS-MCNC: 12.3 PG/ML (ref 0–14)
TROPONIN I SERPL HS-MCNC: 14.8 PG/ML (ref 0–14)
UROBILINOGEN UR QL: 0.2 EU/DL (ref 0.2–1)
WBC # BLD AUTO: 6.8 K/UL (ref 4.3–11.1)

## 2023-09-22 PROCEDURE — 80048 BASIC METABOLIC PNL TOTAL CA: CPT

## 2023-09-22 PROCEDURE — 93005 ELECTROCARDIOGRAM TRACING: CPT | Performed by: EMERGENCY MEDICINE

## 2023-09-22 PROCEDURE — 93010 ELECTROCARDIOGRAM REPORT: CPT | Performed by: INTERNAL MEDICINE

## 2023-09-22 PROCEDURE — 99285 EMERGENCY DEPT VISIT HI MDM: CPT

## 2023-09-22 PROCEDURE — 83880 ASSAY OF NATRIURETIC PEPTIDE: CPT

## 2023-09-22 PROCEDURE — 84484 ASSAY OF TROPONIN QUANT: CPT

## 2023-09-22 PROCEDURE — 71046 X-RAY EXAM CHEST 2 VIEWS: CPT

## 2023-09-22 PROCEDURE — 83735 ASSAY OF MAGNESIUM: CPT

## 2023-09-22 PROCEDURE — 85025 COMPLETE CBC W/AUTO DIFF WBC: CPT

## 2023-09-22 PROCEDURE — 81003 URINALYSIS AUTO W/O SCOPE: CPT

## 2023-09-22 PROCEDURE — 85379 FIBRIN DEGRADATION QUANT: CPT

## 2023-09-22 ASSESSMENT — LIFESTYLE VARIABLES
HOW OFTEN DO YOU HAVE A DRINK CONTAINING ALCOHOL: NEVER
HOW MANY STANDARD DRINKS CONTAINING ALCOHOL DO YOU HAVE ON A TYPICAL DAY: PATIENT DOES NOT DRINK

## 2023-09-22 ASSESSMENT — PAIN - FUNCTIONAL ASSESSMENT: PAIN_FUNCTIONAL_ASSESSMENT: NONE - DENIES PAIN

## 2023-09-22 NOTE — ED TRIAGE NOTES
Per patient shortness of breath with associated productive cough with white sputum x3 days. Patient states of abdominal swelling with lower extremity swelling. Denies gi/gu complications. States of chills denies fever.

## 2023-09-22 NOTE — DISCHARGE INSTRUCTIONS
Follow for any fever or change in sputum since you have had some cough and upper respiratory symptoms for about 3 days  Sleep with extra pillow to keep head slightly elevated  No meals within 2 hours of going to bed  No evidence of cardiac injury or significant arrhythmia identified

## 2023-09-26 ASSESSMENT — ENCOUNTER SYMPTOMS
COUGH: 0
WHEEZING: 0
SHORTNESS OF BREATH: 1
STRIDOR: 0
GASTROINTESTINAL NEGATIVE: 1

## 2023-09-27 NOTE — ED PROVIDER NOTES
Emergency Department Provider Note       PCP: Norma Nunez MD   Age: 70 y.o. Sex: male     DISPOSITION Decision To Discharge 09/22/2023 04:35:32 PM       ICD-10-CM    1. Dyspnea, unspecified type  R06.00       2. Cough, unspecified type  R05.9     Possible viral          Medical Decision Making     Complexity of Problems Addressed:  1 acute issue regarding transient episodic shortness of breath    Data Reviewed and Analyzed:   I independently ordered and reviewed each unique test.  I reviewed external records: provider visit note from PCP. I reviewed external records: provider visit note from outside specialist.   The patients assessment required an independent historian: endeared friends. The reason they were needed is their observations and history. I independently ordered and interpreted the ED EKG in the absence of a Cardiologist.    Rate: 69  EKG Interpretation: EKG Interpretation: atrial fibrillation  ST Segments: No acute changes  I interpreted the X-rays chest x-ray shows no infiltrate. No pneumothorax. No florid pulmonary edema. .  I interpreted the lab work is done including troponin D-dimer BNP and routine studies. BNP is elevated. D-dimer is within normal range. BNP is elevated. .    Discussion of management or test interpretation. Patient with 2 episodes of brief with what sounds like may be some laryngeal spasm and possible microaspiration. Studies today show no evidence of cardiac injury. Pacemaker is interrogated and does not show any adverse events. Chest x-ray is clear. Does have an increase in his BNP. Recommend he follow-up with cardiology for ongoing work-up. D-dimer was negative. No pleuritic pain. No extremity swelling. Risk of Complications and/or Morbidity of Patient Management:  Patient to return with any new or worsening problems or concerns. History      Patient states he has not felt totally well for period of time.   Initial pacemaker was

## 2023-11-08 PROCEDURE — 93294 REM INTERROG EVL PM/LDLS PM: CPT | Performed by: INTERNAL MEDICINE

## 2023-11-08 PROCEDURE — 93296 REM INTERROG EVL PM/IDS: CPT | Performed by: INTERNAL MEDICINE

## 2024-01-21 NOTE — PROGRESS NOTES
[facility] CARDIOLOGY  [address] [place] [address], [address]  [place] [address]  PHONE: [phone]        NAME:  Paul Schaefer  : 1960  MRN: [id number]     PCP:  Dannie Salamanca DO      SUBJECTIVE:   Paul Schaefer is a 63 y.o. male seen for a follow up visit regarding the following:     Chief Complaint   Patient presents with    Atrial Fibrillation    6 Month Follow-Up       HPI:     He is in permanent atrial fibrillation with a ventricular pacemaker.  Ejection fraction historically normal.  [id number] score 2.  Taking Xarelto, losartan, and hydrochlorothiazide but recently had extreme fatigue and malaise [place].  [place] with [place] completely normal including serial troponins, BNP, BMP and CBC.  He is still pacing about 30 to 33% of the time but this is unchanged compared to the previous couple of years.  Improving and I think his symptoms are most likely noncardiac.  He denies any exertional dyspnea, volume overload symptoms, tachypalpitations, or angina whatsoever.     Nuclear stress test 2018 showed normal perfusion without ischemia, EF 61%.  Echo at that time showed low normal EF with no significant valvular pathology and normal otherwise.  He tried toprol XL 25mg daily with recurrent exertional/exercise HR  >180bpm and didn't like the way the Toprol made him feel.  Still with occasional bursts of tachy on interrogation today but usually with bike riding/exercise/exertion around the home.  Previously pacing ~ 45-50% (V-pacing) in permanent AF, but most recent interrogation only 32%.       Nuclear stress test prior to our last visit also normal with ejection fraction 64%, normal left ventricular regional wall motion and ejection fraction, and normal perfusion with no ischemia.  He never had his echocardiogram.  Clinically euvolemic today.    Atrial Fibrillation CHADSVASC2 Score Stroke Risk:   63 y.o. 65-74

## 2024-01-22 ENCOUNTER — OFFICE VISIT (OUTPATIENT)
Age: 64
End: 2024-01-22

## 2024-01-22 VITALS
HEART RATE: 55 BPM | DIASTOLIC BLOOD PRESSURE: 70 MMHG | HEIGHT: 71 IN | SYSTOLIC BLOOD PRESSURE: 118 MMHG | BODY MASS INDEX: 23.1 KG/M2 | WEIGHT: 165 LBS

## 2024-01-22 DIAGNOSIS — R53.81 MALAISE AND FATIGUE: ICD-10-CM

## 2024-01-22 DIAGNOSIS — R06.09 DOE (DYSPNEA ON EXERTION): ICD-10-CM

## 2024-01-22 DIAGNOSIS — R53.83 MALAISE AND FATIGUE: ICD-10-CM

## 2024-01-22 DIAGNOSIS — Z95.0 CARDIAC PACEMAKER: ICD-10-CM

## 2024-01-22 DIAGNOSIS — I48.21 PERMANENT ATRIAL FIBRILLATION (HCC): Primary | ICD-10-CM

## 2024-01-22 RX ORDER — PANTOPRAZOLE SODIUM 40 MG/1
40 TABLET, DELAYED RELEASE ORAL DAILY
COMMUNITY

## 2024-08-10 NOTE — PROGRESS NOTES
[facility] [place] CARDIOLOGY  [address] [place] [address], [address]  [place] [address]  PHONE: [phone]        NAME:  Paul Schaefer  : 1960  MRN: [id number]     PCP:  Dannie Salamanca DO      SUBJECTIVE:   Paul Schaefer is a 64 y.o. male seen for a follow up visit regarding the following:     Chief Complaint   Patient presents with    Permanent atrial fibrillation (HCC)       HPI:    He is in permanent atrial fibrillation with a ventricular pacemaker.  Ejection fraction historically normal.  [id number]-GGJf score 2.  Taking Xarelto, losartan, and hydrochlorothiazide but recently had extreme fatigue and malaise [place].  He [place] with evaluation completely normal including serial troponins, BNP, BMP and CBC.  He is still pacing about 30 to 35% of the time but this is unchanged compared to the previous couple of years.  He denies any exertional dyspnea, volume overload symptoms, tachypalpitations, or angina whatsoever.     Nuclear stress test 2018 showed normal perfusion without ischemia, EF 61%.  Echo at that time showed low normal EF with no significant valvular pathology and normal otherwise.  He tried toprol XL 25mg daily with recurrent exertional/exercise HR  >180bpm and didn't like the way the Toprol made him feel.  Still with occasional bursts of tachy on interrogation today but usually with bike riding/exercise/exertion around the home.  Previously pacing ~ 45-50% (V-pacing) in permanent AF, but most recent interrogation only 34%.       Nuclear stress test prior to our last visit also normal with ejection fraction 64%, normal left ventricular regional wall motion and ejection fraction, and normal perfusion with no ischemia.  He never had his echocardiogram.  Clinically euvolemic today.    Atrial Fibrillation CHADSVASC2 Score Stroke Risk:   64 y.o. 65-74 - 1   male Male - 0   CHF HX: No - 0   HTN HX: Yes - 1

## 2024-08-13 ENCOUNTER — NURSE ONLY (OUTPATIENT)
Age: 64
End: 2024-08-13

## 2024-08-13 ENCOUNTER — OFFICE VISIT (OUTPATIENT)
Age: 64
End: 2024-08-13
Payer: MEDICARE

## 2024-08-13 VITALS
WEIGHT: 168 LBS | HEART RATE: 55 BPM | DIASTOLIC BLOOD PRESSURE: 76 MMHG | HEIGHT: 71 IN | BODY MASS INDEX: 23.52 KG/M2 | SYSTOLIC BLOOD PRESSURE: 130 MMHG

## 2024-08-13 DIAGNOSIS — I49.5 SICK SINUS SYNDROME (HCC): Primary | ICD-10-CM

## 2024-08-13 DIAGNOSIS — I48.21 PERMANENT ATRIAL FIBRILLATION (HCC): ICD-10-CM

## 2024-08-13 DIAGNOSIS — I48.21 PERMANENT ATRIAL FIBRILLATION (HCC): Primary | ICD-10-CM

## 2024-08-13 DIAGNOSIS — I49.5 SICK SINUS SYNDROME (HCC): ICD-10-CM

## 2024-08-13 PROCEDURE — 3017F COLORECTAL CA SCREEN DOC REV: CPT | Performed by: INTERNAL MEDICINE

## 2024-08-13 PROCEDURE — G8420 CALC BMI NORM PARAMETERS: HCPCS | Performed by: INTERNAL MEDICINE

## 2024-08-13 PROCEDURE — 99214 OFFICE O/P EST MOD 30 MIN: CPT | Performed by: INTERNAL MEDICINE

## 2024-08-13 PROCEDURE — G8427 DOCREV CUR MEDS BY ELIG CLIN: HCPCS | Performed by: INTERNAL MEDICINE

## 2024-08-13 PROCEDURE — 1123F ACP DISCUSS/DSCN MKR DOCD: CPT | Performed by: INTERNAL MEDICINE

## 2024-08-13 PROCEDURE — 1036F TOBACCO NON-USER: CPT | Performed by: INTERNAL MEDICINE

## 2024-09-18 ENCOUNTER — TELEPHONE (OUTPATIENT)
Age: 64
End: 2024-09-18

## 2024-09-19 ENCOUNTER — TELEPHONE (OUTPATIENT)
Age: 64
End: 2024-09-19

## 2024-09-23 ENCOUNTER — OFFICE VISIT (OUTPATIENT)
Age: 64
End: 2024-09-23
Payer: MEDICARE

## 2024-09-23 VITALS
BODY MASS INDEX: 23.84 KG/M2 | WEIGHT: 170.3 LBS | HEART RATE: 54 BPM | DIASTOLIC BLOOD PRESSURE: 82 MMHG | HEIGHT: 71 IN | SYSTOLIC BLOOD PRESSURE: 162 MMHG

## 2024-09-23 DIAGNOSIS — I49.5 SICK SINUS SYNDROME (HCC): ICD-10-CM

## 2024-09-23 DIAGNOSIS — R53.81 MALAISE AND FATIGUE: ICD-10-CM

## 2024-09-23 DIAGNOSIS — R06.09 DOE (DYSPNEA ON EXERTION): ICD-10-CM

## 2024-09-23 DIAGNOSIS — R53.83 MALAISE AND FATIGUE: ICD-10-CM

## 2024-09-23 DIAGNOSIS — Z95.0 CARDIAC PACEMAKER: ICD-10-CM

## 2024-09-23 DIAGNOSIS — I48.21 PERMANENT ATRIAL FIBRILLATION (HCC): Primary | ICD-10-CM

## 2024-09-23 PROCEDURE — 1123F ACP DISCUSS/DSCN MKR DOCD: CPT | Performed by: INTERNAL MEDICINE

## 2024-09-23 PROCEDURE — 93000 ELECTROCARDIOGRAM COMPLETE: CPT | Performed by: INTERNAL MEDICINE

## 2024-09-23 PROCEDURE — G8420 CALC BMI NORM PARAMETERS: HCPCS | Performed by: INTERNAL MEDICINE

## 2024-09-23 PROCEDURE — 99204 OFFICE O/P NEW MOD 45 MIN: CPT | Performed by: INTERNAL MEDICINE

## 2024-09-23 PROCEDURE — 1036F TOBACCO NON-USER: CPT | Performed by: INTERNAL MEDICINE

## 2024-09-23 PROCEDURE — 3017F COLORECTAL CA SCREEN DOC REV: CPT | Performed by: INTERNAL MEDICINE

## 2024-09-23 PROCEDURE — G8428 CUR MEDS NOT DOCUMENT: HCPCS | Performed by: INTERNAL MEDICINE

## 2024-09-23 RX ORDER — BUDESONIDE AND FORMOTEROL FUMARATE DIHYDRATE 160; 4.5 UG/1; UG/1
AEROSOL RESPIRATORY (INHALATION)
COMMUNITY
Start: 2024-09-09

## 2024-09-23 RX ORDER — ALBUTEROL SULFATE 90 UG/1
2 INHALANT RESPIRATORY (INHALATION) EVERY 6 HOURS PRN
COMMUNITY
Start: 2024-08-22

## 2024-09-23 ASSESSMENT — ENCOUNTER SYMPTOMS
GASTROINTESTINAL NEGATIVE: 1
ALLERGIC/IMMUNOLOGIC NEGATIVE: 1
EYES NEGATIVE: 1
SHORTNESS OF BREATH: 1

## 2024-12-12 ENCOUNTER — TELEPHONE (OUTPATIENT)
Age: 64
End: 2024-12-12

## 2024-12-12 NOTE — TELEPHONE ENCOUNTER
Cardiac Clearance        Physician or Practice Requesting: [facility]   :   Contact Phone Number: [phone]  Fax Number: [phone]  Date of Surgery/Procedure: 01/09/25  Type of Surgery or Procedure: EGD/Colonoscopy   Type of Anesthesia:   Type of Clearance Requested: risk assessment and any medication hold   Medication to Hold:Xarelto   Days to Hold: 48 hour hold

## 2024-12-12 NOTE — TELEPHONE ENCOUNTER
The patient has a mild to moderate CV risk for a low risk surgery/procedure. Hold the blood thinner for 2-3 days and restart when able.     KRISTI

## 2024-12-19 ENCOUNTER — TELEPHONE (OUTPATIENT)
Age: 64
End: 2024-12-19

## 2024-12-19 NOTE — TELEPHONE ENCOUNTER
Re-scheduled patient due to need to work urgent consult in.    Patient verbalized understanding and confirmed date/time.

## 2024-12-26 ENCOUNTER — OFFICE VISIT (OUTPATIENT)
Age: 64
End: 2024-12-26
Payer: MEDICARE

## 2024-12-26 VITALS
WEIGHT: 169.8 LBS | HEIGHT: 71 IN | HEART RATE: 56 BPM | DIASTOLIC BLOOD PRESSURE: 66 MMHG | SYSTOLIC BLOOD PRESSURE: 130 MMHG | BODY MASS INDEX: 23.77 KG/M2

## 2024-12-26 DIAGNOSIS — I48.21 PERMANENT ATRIAL FIBRILLATION (HCC): Primary | ICD-10-CM

## 2024-12-26 PROCEDURE — 1036F TOBACCO NON-USER: CPT | Performed by: INTERNAL MEDICINE

## 2024-12-26 PROCEDURE — 1123F ACP DISCUSS/DSCN MKR DOCD: CPT | Performed by: INTERNAL MEDICINE

## 2024-12-26 PROCEDURE — G8484 FLU IMMUNIZE NO ADMIN: HCPCS | Performed by: INTERNAL MEDICINE

## 2024-12-26 PROCEDURE — 3017F COLORECTAL CA SCREEN DOC REV: CPT | Performed by: INTERNAL MEDICINE

## 2024-12-26 PROCEDURE — G8428 CUR MEDS NOT DOCUMENT: HCPCS | Performed by: INTERNAL MEDICINE

## 2024-12-26 PROCEDURE — G8420 CALC BMI NORM PARAMETERS: HCPCS | Performed by: INTERNAL MEDICINE

## 2024-12-26 PROCEDURE — 99214 OFFICE O/P EST MOD 30 MIN: CPT | Performed by: INTERNAL MEDICINE

## 2024-12-26 PROCEDURE — 93000 ELECTROCARDIOGRAM COMPLETE: CPT | Performed by: INTERNAL MEDICINE

## 2024-12-26 PROCEDURE — 1126F AMNT PAIN NOTED NONE PRSNT: CPT | Performed by: INTERNAL MEDICINE

## 2024-12-26 NOTE — PROGRESS NOTES
[facility] CARDIOLOGY  [address] [place] [address], [address]  [place] [address]  PHONE: [phone]        24      NAME:  Paul Schaefer  : 1960  MRN: [id number]     Referring Cardiologist: ERON Portillo MD     Reason for Consultation: Symptomatic bradycardia.     ASSESSMENT and PLAN:  Paul was seen today for atrial fibrillation.    Diagnoses and all orders for this visit:    Permanent atrial fibrillation (HCC)    Cardiac pacemaker, original SCPM implanted , changeout in      Sick sinus syndrome (HCC)    MONTES (dyspnea on exertion)    Malaise and fatigue    Mild edema    64 year old male with a history of permanent atrial fibrillation and SCPM for symptomatic bradycardia. His original RV lead was implanted in . He RV paces about 30-50% which can fluctuate. For years, he lived fine with nominal pacemaker settings. Recently he's gotten worse and feels more dyspnea and lethargy/brain fog. His EF is WNL and he is rather euvolemic on exam. He reports mild GERD but nothing severe. His symptoms seem to be out of context to the degree of pathology observed on his workup. He could just feel worse with RV only pacing and this has caught up with him, perhaps if he's developed less favorable diastology over time. Perhaps an upgrade to a CRT device or LBBAP lead could help. Another concern is IV access with a right sided chronic CIED. A leadless system could also be considered but I think with this symptoms, he's more likely to feel improved with CRT vs CSP. Other thought is it worth extracting his old RV lead and starting fresh. It's a challenging situation b/c there is no clear indication to do anything currently.     -Permanent AF - stable, continue Xarelto.     -Symptomatic bradycardia - unclear on next steps with current device as above, ideally will be conservation here. Will look to reach out to his cardiology in [facility], Dr. Jalil Duncan.     -HFpEF - stable, continue current

## 2025-01-24 RX ORDER — HYDROCHLOROTHIAZIDE 25 MG/1
25 TABLET ORAL DAILY
Qty: 90 TABLET | Refills: 3 | Status: SHIPPED | OUTPATIENT
Start: 2025-01-24

## 2025-01-24 NOTE — TELEPHONE ENCOUNTER
Requested Prescriptions     Pending Prescriptions Disp Refills    hydroCHLOROthiazide (HYDRODIURIL) 25 MG tablet [Pharmacy Med Name: HYDROCHLOROTHIAZIDE 25MG TABLETS] 90 tablet 3     Sig: TAKE 1 TABLET BY MOUTH DAILY     Verified rx. Last OV 08/13/24. Erx to pharm on file.

## 2025-04-06 NOTE — PROGRESS NOTES
lower rate limit of 50bpm to prevent lots of RV pacing.  Currently pacing RV 34% of the time... He states that since our last visit he has felt well with no significant exertional dyspnea or fatigue.  Eventually we may need to ? consider BiV upgrade and increased base rate if continues to complain of worsening SOB/MONTES/fatigue in the future.       Permanent atrial fibrillation (HCC)- rate control stable, very active without palpitations, presyncope or syncope.  Continue pacer interrogation surveillance, add rate slowing meds as needed in  future.   CHADS-VASC of 2 - tolerating Xarelto.     Hypertension-well-controlled, stop losartan and HCTZ on his own with no hypertension and no volume overload symptoms.  Stay hydrated but minimize sodium and continue healthy diet and lifestyle.  Follow clinically for now     Cardiac pacemaker- site looks good today, see above, continue routine surveillance.             Return in about 6 months (around 10/9/2025).         CIERA BONILLA MD  4/9/2025  3:24 PM

## 2025-04-09 ENCOUNTER — OFFICE VISIT (OUTPATIENT)
Age: 65
End: 2025-04-09
Payer: MEDICARE

## 2025-04-09 VITALS
HEART RATE: 62 BPM | SYSTOLIC BLOOD PRESSURE: 132 MMHG | DIASTOLIC BLOOD PRESSURE: 76 MMHG | WEIGHT: 169 LBS | HEIGHT: 71 IN | BODY MASS INDEX: 23.66 KG/M2

## 2025-04-09 DIAGNOSIS — R53.81 MALAISE AND FATIGUE: ICD-10-CM

## 2025-04-09 DIAGNOSIS — I49.5 SICK SINUS SYNDROME (HCC): ICD-10-CM

## 2025-04-09 DIAGNOSIS — R53.83 MALAISE AND FATIGUE: ICD-10-CM

## 2025-04-09 DIAGNOSIS — Z95.0 CARDIAC PACEMAKER: ICD-10-CM

## 2025-04-09 DIAGNOSIS — R06.09 DOE (DYSPNEA ON EXERTION): ICD-10-CM

## 2025-04-09 DIAGNOSIS — I48.21 PERMANENT ATRIAL FIBRILLATION (HCC): Primary | ICD-10-CM

## 2025-04-09 PROCEDURE — 3017F COLORECTAL CA SCREEN DOC REV: CPT | Performed by: INTERNAL MEDICINE

## 2025-04-09 PROCEDURE — 1036F TOBACCO NON-USER: CPT | Performed by: INTERNAL MEDICINE

## 2025-04-09 PROCEDURE — 1160F RVW MEDS BY RX/DR IN RCRD: CPT | Performed by: INTERNAL MEDICINE

## 2025-04-09 PROCEDURE — 99214 OFFICE O/P EST MOD 30 MIN: CPT | Performed by: INTERNAL MEDICINE

## 2025-04-09 PROCEDURE — G8420 CALC BMI NORM PARAMETERS: HCPCS | Performed by: INTERNAL MEDICINE

## 2025-04-09 PROCEDURE — 1126F AMNT PAIN NOTED NONE PRSNT: CPT | Performed by: INTERNAL MEDICINE

## 2025-04-09 PROCEDURE — G8427 DOCREV CUR MEDS BY ELIG CLIN: HCPCS | Performed by: INTERNAL MEDICINE

## 2025-04-09 PROCEDURE — 1159F MED LIST DOCD IN RCRD: CPT | Performed by: INTERNAL MEDICINE

## 2025-04-09 PROCEDURE — 1123F ACP DISCUSS/DSCN MKR DOCD: CPT | Performed by: INTERNAL MEDICINE

## 2025-05-16 PROCEDURE — 93296 REM INTERROG EVL PM/IDS: CPT | Performed by: INTERNAL MEDICINE

## 2025-05-16 PROCEDURE — 93294 REM INTERROG EVL PM/LDLS PM: CPT | Performed by: INTERNAL MEDICINE

## 2025-08-05 ENCOUNTER — CLINICAL SUPPORT (OUTPATIENT)
Age: 65
End: 2025-08-05
Payer: MEDICARE

## 2025-08-05 ENCOUNTER — OFFICE VISIT (OUTPATIENT)
Age: 65
End: 2025-08-05
Payer: MEDICARE

## 2025-08-05 VITALS
HEIGHT: 71 IN | HEART RATE: 50 BPM | BODY MASS INDEX: 24.14 KG/M2 | SYSTOLIC BLOOD PRESSURE: 146 MMHG | DIASTOLIC BLOOD PRESSURE: 82 MMHG | WEIGHT: 172.4 LBS

## 2025-08-05 DIAGNOSIS — I48.21 PERMANENT ATRIAL FIBRILLATION (HCC): Primary | ICD-10-CM

## 2025-08-05 DIAGNOSIS — I49.5 TACHY-BRADY SYNDROME (HCC): Primary | ICD-10-CM

## 2025-08-05 PROCEDURE — 1123F ACP DISCUSS/DSCN MKR DOCD: CPT | Performed by: INTERNAL MEDICINE

## 2025-08-05 PROCEDURE — 1036F TOBACCO NON-USER: CPT | Performed by: INTERNAL MEDICINE

## 2025-08-05 PROCEDURE — 1159F MED LIST DOCD IN RCRD: CPT | Performed by: INTERNAL MEDICINE

## 2025-08-05 PROCEDURE — G8427 DOCREV CUR MEDS BY ELIG CLIN: HCPCS | Performed by: INTERNAL MEDICINE

## 2025-08-05 PROCEDURE — 99214 OFFICE O/P EST MOD 30 MIN: CPT | Performed by: INTERNAL MEDICINE

## 2025-08-05 PROCEDURE — 93279 PRGRMG DEV EVAL PM/LDLS PM: CPT | Performed by: INTERNAL MEDICINE

## 2025-08-05 PROCEDURE — G8420 CALC BMI NORM PARAMETERS: HCPCS | Performed by: INTERNAL MEDICINE

## 2025-08-05 PROCEDURE — 1126F AMNT PAIN NOTED NONE PRSNT: CPT | Performed by: INTERNAL MEDICINE

## 2025-08-05 PROCEDURE — 3017F COLORECTAL CA SCREEN DOC REV: CPT | Performed by: INTERNAL MEDICINE

## 2025-08-05 PROCEDURE — 1160F RVW MEDS BY RX/DR IN RCRD: CPT | Performed by: INTERNAL MEDICINE

## 2025-08-05 RX ORDER — LOSARTAN POTASSIUM 25 MG/1
TABLET ORAL
Qty: 90 TABLET | Refills: 3 | Status: SHIPPED | OUTPATIENT
Start: 2025-08-05

## 2025-08-05 ASSESSMENT — ENCOUNTER SYMPTOMS
SHORTNESS OF BREATH: 1
GASTROINTESTINAL NEGATIVE: 1
EYES NEGATIVE: 1
ALLERGIC/IMMUNOLOGIC NEGATIVE: 1

## (undated) DEVICE — DERMABOND SKIN ADH 0.7ML -- DERMABOND ADVANCED 12/BX

## (undated) DEVICE — SUTURE VCRL + SZ 4-0 L27IN ABSRB UD PS-2 3/8 CIR REV CUT VCP426H

## (undated) DEVICE — PLASMABLADE X PS210-030S-LIGHT 3.0SL: Brand: PLASMABLADE™ X

## (undated) DEVICE — DRESSING POSTOP AG PRISMASEAL 3.5X6IN

## (undated) DEVICE — SUTURE 3-0 VCRL CTD SH-1 J219H

## (undated) DEVICE — SUTURE ABSORBABLE BRAIDED 2-0 CT-1 27 IN UD VICRYL J259H